# Patient Record
Sex: FEMALE | Race: ASIAN | Employment: UNEMPLOYED | URBAN - METROPOLITAN AREA
[De-identification: names, ages, dates, MRNs, and addresses within clinical notes are randomized per-mention and may not be internally consistent; named-entity substitution may affect disease eponyms.]

---

## 2018-02-17 ENCOUNTER — HOSPITAL ENCOUNTER (EMERGENCY)
Age: 28
Discharge: HOME | End: 2018-02-17
Attending: EMERGENCY MEDICINE
Payer: MEDICAID

## 2018-02-17 VITALS
OXYGEN SATURATION: 99 % | SYSTOLIC BLOOD PRESSURE: 124 MMHG | WEIGHT: 125 LBS | HEIGHT: 63 IN | TEMPERATURE: 98 F | BODY MASS INDEX: 22.15 KG/M2 | RESPIRATION RATE: 16 BRPM | HEART RATE: 69 BPM | DIASTOLIC BLOOD PRESSURE: 78 MMHG

## 2018-02-17 DIAGNOSIS — S60.222A CONTUSION OF LEFT HAND, INITIAL ENCOUNTER: Primary | ICD-10-CM

## 2018-02-17 DIAGNOSIS — S80.11XA CONTUSION OF RIGHT LOWER EXTREMITY, INITIAL ENCOUNTER: ICD-10-CM

## 2018-02-17 PROCEDURE — 99282 EMERGENCY DEPT VISIT SF MDM: CPT
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
## 2022-11-07 ENCOUNTER — APPOINTMENT (OUTPATIENT)
Dept: URBAN - METROPOLITAN AREA CLINIC 242 | Age: 32
Setting detail: DERMATOLOGY
End: 2022-11-07

## 2022-11-07 DIAGNOSIS — L70.0 ACNE VULGARIS: ICD-10-CM

## 2022-11-07 PROCEDURE — OTHER PRESCRIPTION: OTHER

## 2022-11-07 PROCEDURE — 99203 OFFICE O/P NEW LOW 30 MIN: CPT

## 2022-11-07 PROCEDURE — OTHER COUNSELING: OTHER

## 2022-11-07 PROCEDURE — OTHER PRESCRIPTION MEDICATION MANAGEMENT: OTHER

## 2022-11-07 RX ORDER — CLASCOTERONE 1 G/100G
CREAM TOPICAL
Qty: 60 | Refills: 1 | Status: ERX | COMMUNITY
Start: 2022-11-07

## 2022-11-07 RX ORDER — TRETIONIN 0.25 MG/G
CREAM TOPICAL
Qty: 45 | Refills: 1 | Status: ERX | COMMUNITY
Start: 2022-11-07

## 2022-11-07 ASSESSMENT — LOCATION SIMPLE DESCRIPTION DERM: LOCATION SIMPLE: RIGHT CHEEK

## 2022-11-07 ASSESSMENT — LOCATION DETAILED DESCRIPTION DERM: LOCATION DETAILED: RIGHT INFERIOR CENTRAL MALAR CHEEK

## 2022-11-07 ASSESSMENT — LOCATION ZONE DERM: LOCATION ZONE: FACE

## 2022-11-07 NOTE — PROCEDURE: COUNSELING
Minocycline Pregnancy And Lactation Text: This medication is Pregnancy Category D and not consider safe during pregnancy. It is also excreted in breast milk.
Isotretinoin Counseling: Patient should get monthly blood tests, not donate blood, not drive at night if vision affected, not share medication, and not undergo elective surgery for 6 months after tx completed. Side effects reviewed, pt to contact office should one occur.
Include Pregnancy/Lactation Warning?: No
Sarecycline Counseling: Patient advised regarding possible photosensitivity and discoloration of the teeth, skin, lips, tongue and gums.  Patient instructed to avoid sunlight, if possible.  When exposed to sunlight, patients should wear protective clothing, sunglasses, and sunscreen.  The patient was instructed to call the office immediately if the following severe adverse effects occur:  hearing changes, easy bruising/bleeding, severe headache, or vision changes.  The patient verbalized understanding of the proper use and possible adverse effects of sarecycline.  All of the patient's questions and concerns were addressed.
Doxycycline Counseling:  Patient counseled regarding possible photosensitivity and increased risk for sunburn.  Patient instructed to avoid sunlight, if possible.  When exposed to sunlight, patients should wear protective clothing, sunglasses, and sunscreen.  The patient was instructed to call the office immediately if the following severe adverse effects occur:  hearing changes, easy bruising/bleeding, severe headache, or vision changes.  The patient verbalized understanding of the proper use and possible adverse effects of doxycycline.  All of the patient's questions and concerns were addressed.
Doxycycline Pregnancy And Lactation Text: This medication is Pregnancy Category D and not consider safe during pregnancy. It is also excreted in breast milk but is considered safe for shorter treatment courses.
Dapsone Pregnancy And Lactation Text: This medication is Pregnancy Category C and is not considered safe during pregnancy or breast feeding.
Winlevi Pregnancy And Lactation Text: This medication is considered safe during pregnancy and breastfeeding.
Topical Sulfur Applications Counseling: Topical Sulfur Counseling: Patient counseled that this medication may cause skin irritation or allergic reactions.  In the event of skin irritation, the patient was advised to reduce the amount of the drug applied or use it less frequently.   The patient verbalized understanding of the proper use and possible adverse effects of topical sulfur application.  All of the patient's questions and concerns were addressed.
Topical Clindamycin Pregnancy And Lactation Text: This medication is Pregnancy Category B and is considered safe during pregnancy. It is unknown if it is excreted in breast milk.
Azelaic Acid Pregnancy And Lactation Text: This medication is considered safe during pregnancy and breast feeding.
Isotretinoin Pregnancy And Lactation Text: This medication is Pregnancy Category X and is considered extremely dangerous during pregnancy. It is unknown if it is excreted in breast milk.
Azelaic Acid Counseling: Patient counseled that medicine may cause skin irritation and to avoid applying near the eyes.  In the event of skin irritation, the patient was advised to reduce the amount of the drug applied or use it less frequently.   The patient verbalized understanding of the proper use and possible adverse effects of azelaic acid.  All of the patient's questions and concerns were addressed.
Topical Retinoid counseling:  Patient advised to apply a pea-sized amount only at bedtime and wait 30 minutes after washing their face before applying.  If too drying, patient may add a non-comedogenic moisturizer. The patient verbalized understanding of the proper use and possible adverse effects of retinoids.  All of the patient's questions and concerns were addressed.
Erythromycin Counseling:  I discussed with the patient the risks of erythromycin including but not limited to GI upset, allergic reaction, drug rash, diarrhea, increase in liver enzymes, and yeast infections.
Tetracycline Counseling: Patient counseled regarding possible photosensitivity and increased risk for sunburn.  Patient instructed to avoid sunlight, if possible.  When exposed to sunlight, patients should wear protective clothing, sunglasses, and sunscreen.  The patient was instructed to call the office immediately if the following severe adverse effects occur:  hearing changes, easy bruising/bleeding, severe headache, or vision changes.  The patient verbalized understanding of the proper use and possible adverse effects of tetracycline.  All of the patient's questions and concerns were addressed. Patient understands to avoid pregnancy while on therapy due to potential birth defects.
High Dose Vitamin A Pregnancy And Lactation Text: High dose vitamin A therapy is contraindicated during pregnancy and breast feeding.
Dapsone Counseling: I discussed with the patient the risks of dapsone including but not limited to hemolytic anemia, agranulocytosis, rashes, methemoglobinemia, kidney failure, peripheral neuropathy, headaches, GI upset, and liver toxicity.  Patients who start dapsone require monitoring including baseline LFTs and weekly CBCs for the first month, then every month thereafter.  The patient verbalized understanding of the proper use and possible adverse effects of dapsone.  All of the patient's questions and concerns were addressed.
Birth Control Pills Counseling: Birth Control Pill Counseling: I discussed with the patient the potential side effects of OCPs including but not limited to increased risk of stroke, heart attack, thrombophlebitis, deep venous thrombosis, hepatic adenomas, breast changes, GI upset, headaches, and depression.  The patient verbalized understanding of the proper use and possible adverse effects of OCPs. All of the patient's questions and concerns were addressed.
Detail Level: Zone
Birth Control Pills Pregnancy And Lactation Text: This medication should be avoided if pregnant and for the first 30 days post-partum.
Topical Clindamycin Counseling: Patient counseled that this medication may cause skin irritation or allergic reactions.  In the event of skin irritation, the patient was advised to reduce the amount of the drug applied or use it less frequently.   The patient verbalized understanding of the proper use and possible adverse effects of clindamycin.  All of the patient's questions and concerns were addressed.
Benzoyl Peroxide Pregnancy And Lactation Text: This medication is Pregnancy Category C. It is unknown if benzoyl peroxide is excreted in breast milk.
none
Tazorac Pregnancy And Lactation Text: This medication is not safe during pregnancy. It is unknown if this medication is excreted in breast milk.
Bactrim Pregnancy And Lactation Text: This medication is Pregnancy Category D and is known to cause fetal risk.  It is also excreted in breast milk.
High Dose Vitamin A Counseling: Side effects reviewed, pt to contact office should one occur.
Aklief counseling:  Patient advised to apply a pea-sized amount only at bedtime and wait 30 minutes after washing their face before applying.  If too drying, patient may add a non-comedogenic moisturizer.  The most commonly reported side effects including irritation, redness, scaling, dryness, stinging, burning, itching, and increased risk of sunburn.  The patient verbalized understanding of the proper use and possible adverse effects of retinoids.  All of the patient's questions and concerns were addressed.
Spironolactone Pregnancy And Lactation Text: This medication can cause feminization of the male fetus and should be avoided during pregnancy. The active metabolite is also found in breast milk.
Aklief Pregnancy And Lactation Text: It is unknown if this medication is safe to use during pregnancy.  It is unknown if this medication is excreted in breast milk.  Breastfeeding women should use the topical cream on the smallest area of the skin for the shortest time needed while breastfeeding.  Do not apply to nipple and areola.
Minocycline Counseling: Patient advised regarding possible photosensitivity and discoloration of the teeth, skin, lips, tongue and gums.  Patient instructed to avoid sunlight, if possible.  When exposed to sunlight, patients should wear protective clothing, sunglasses, and sunscreen.  The patient was instructed to call the office immediately if the following severe adverse effects occur:  hearing changes, easy bruising/bleeding, severe headache, or vision changes.  The patient verbalized understanding of the proper use and possible adverse effects of minocycline.  All of the patient's questions and concerns were addressed.
Bactrim Counseling:  I discussed with the patient the risks of sulfa antibiotics including but not limited to GI upset, allergic reaction, drug rash, diarrhea, dizziness, photosensitivity, and yeast infections.  Rarely, more serious reactions can occur including but not limited to aplastic anemia, agranulocytosis, methemoglobinemia, blood dyscrasias, liver or kidney failure, lung infiltrates or desquamative/blistering drug rashes.
Winlevi Counseling:  I discussed with the patient the risks of topical clascoterone including but not limited to erythema, scaling, itching, and stinging. Patient voiced their understanding.
Spironolactone Counseling: Patient advised regarding risks of diarrhea, abdominal pain, hyperkalemia, birth defects (for female patients), liver toxicity and renal toxicity. The patient may need blood work to monitor liver and kidney function and potassium levels while on therapy. The patient verbalized understanding of the proper use and possible adverse effects of spironolactone.  All of the patient's questions and concerns were addressed.
Topical Retinoid Pregnancy And Lactation Text: This medication is Pregnancy Category C. It is unknown if this medication is excreted in breast milk.
Azithromycin Pregnancy And Lactation Text: This medication is considered safe during pregnancy and is also secreted in breast milk.
Erythromycin Pregnancy And Lactation Text: This medication is Pregnancy Category B and is considered safe during pregnancy. It is also excreted in breast milk.
Topical Sulfur Applications Pregnancy And Lactation Text: This medication is Pregnancy Category C and has an unknown safety profile during pregnancy. It is unknown if this topical medication is excreted in breast milk.
Azithromycin Counseling:  I discussed with the patient the risks of azithromycin including but not limited to GI upset, allergic reaction, drug rash, diarrhea, and yeast infections.
Benzoyl Peroxide Counseling: Patient counseled that medicine may cause skin irritation and bleach clothing.  In the event of skin irritation, the patient was advised to reduce the amount of the drug applied or use it less frequently.   The patient verbalized understanding of the proper use and possible adverse effects of benzoyl peroxide.  All of the patient's questions and concerns were addressed.
Tazorac Counseling:  Patient advised that medication is irritating and drying.  Patient may need to apply sparingly and wash off after an hour before eventually leaving it on overnight.  The patient verbalized understanding of the proper use and possible adverse effects of tazorac.  All of the patient's questions and concerns were addressed.

## 2023-03-17 ENCOUNTER — OFFICE VISIT (OUTPATIENT)
Dept: FAMILY MEDICINE CLINIC | Facility: CLINIC | Age: 33
End: 2023-03-17
Payer: COMMERCIAL

## 2023-03-17 VITALS
SYSTOLIC BLOOD PRESSURE: 110 MMHG | WEIGHT: 136 LBS | RESPIRATION RATE: 16 BRPM | HEART RATE: 100 BPM | BODY MASS INDEX: 23.22 KG/M2 | DIASTOLIC BLOOD PRESSURE: 60 MMHG | OXYGEN SATURATION: 98 % | HEIGHT: 64 IN | TEMPERATURE: 98 F

## 2023-03-17 DIAGNOSIS — F41.1 GAD (GENERALIZED ANXIETY DISORDER): ICD-10-CM

## 2023-03-17 DIAGNOSIS — Z00.00 WELL WOMAN EXAM WITHOUT GYNECOLOGICAL EXAM: Primary | ICD-10-CM

## 2023-03-17 PROCEDURE — 3078F DIAST BP <80 MM HG: CPT | Performed by: FAMILY MEDICINE

## 2023-03-17 PROCEDURE — 99385 PREV VISIT NEW AGE 18-39: CPT | Performed by: FAMILY MEDICINE

## 2023-03-17 PROCEDURE — 3074F SYST BP LT 130 MM HG: CPT | Performed by: FAMILY MEDICINE

## 2023-03-17 PROCEDURE — 3008F BODY MASS INDEX DOCD: CPT | Performed by: FAMILY MEDICINE

## 2023-03-17 RX ORDER — FLUOXETINE 10 MG/1
10 CAPSULE ORAL DAILY
Qty: 30 CAPSULE | Refills: 1 | Status: SHIPPED | OUTPATIENT
Start: 2023-03-17

## 2023-04-01 ENCOUNTER — PATIENT MESSAGE (OUTPATIENT)
Dept: FAMILY MEDICINE CLINIC | Facility: CLINIC | Age: 33
End: 2023-04-01

## 2023-04-03 ENCOUNTER — TELEPHONE (OUTPATIENT)
Dept: FAMILY MEDICINE CLINIC | Facility: CLINIC | Age: 33
End: 2023-04-03

## 2023-04-03 NOTE — TELEPHONE ENCOUNTER
Patient is calilng she needs a clearance letter  Dr. Karyna Cooper is requesting a letter that she can be on Fluoxetine 10 mg and is it ok for her to take it with Accutane twice daily 30 mg. Is that ok? She has been off the Accutane for a few days and she is worried.  Please call too

## 2023-05-05 ENCOUNTER — TELEMEDICINE (OUTPATIENT)
Dept: FAMILY MEDICINE CLINIC | Facility: CLINIC | Age: 33
End: 2023-05-05
Payer: COMMERCIAL

## 2023-05-05 DIAGNOSIS — F41.1 GAD (GENERALIZED ANXIETY DISORDER): ICD-10-CM

## 2023-05-05 RX ORDER — ISOTRETINOIN 30 MG/1
CAPSULE, LIQUID FILLED ORAL DAILY
COMMUNITY
Start: 2023-04-03

## 2023-05-05 RX ORDER — FLUOXETINE HYDROCHLORIDE 20 MG/1
20 CAPSULE ORAL DAILY
Qty: 90 CAPSULE | Refills: 0 | Status: SHIPPED | OUTPATIENT
Start: 2023-05-05

## 2023-06-09 ENCOUNTER — PATIENT MESSAGE (OUTPATIENT)
Dept: FAMILY MEDICINE CLINIC | Facility: CLINIC | Age: 33
End: 2023-06-09

## 2023-06-09 NOTE — TELEPHONE ENCOUNTER
From: Franklin Nichole  To: Claudean Joiner, DO  Sent: 6/9/2023 1:34 PM CDT  Subject: Fluoxetine 20 MG - Side Effects Inquiry    Hi Dr. Drew Contreras,    [facility] you are doing well :)    I have been taking the increased dose of Fluoxetine 20 MG consistently and have seen positive results. I feel it has decreased my anxious tendencies and I do hope to continue. However, there are 2 side effects I am concerned about. I am developing abnormal amount of hair growth on my face - Hirsutism/beard area and significant hair loss on my head. I am androgen sensitive and wanted to know if this medicine has effect on the adrenal glands - maybe producing androgens? Secondly - I am having extreme drowsiness in the evening. If you can please advise?     best regards,    Bethanie Quiñones

## 2023-07-26 DIAGNOSIS — F41.1 GAD (GENERALIZED ANXIETY DISORDER): ICD-10-CM

## 2023-07-26 NOTE — TELEPHONE ENCOUNTER
Refill request for:    Requested Prescriptions     Pending Prescriptions Disp Refills    FLUOXETINE 20 MG Oral Cap [Pharmacy Med Name: FLUOXETINE HCL 20 MG CAPSULE] 90 capsule 0     Sig: TAKE 1 CAPSULE BY MOUTH EVERY DAY        Last Prescribed Quantity Refills   5/5/23 90 3     LOV 3/17/2023 Tele: 5/5/23    Patient was asked to follow-up in: 3 month    Appointment scheduled: Visit date not found    Medication not on protocol. Pt needs a visit for 3 months medication recheck. Please assist.  Thank you.     Routed to front office

## 2023-07-31 RX ORDER — FLUOXETINE HYDROCHLORIDE 20 MG/1
20 CAPSULE ORAL DAILY
Qty: 90 CAPSULE | Refills: 0 | Status: SHIPPED | OUTPATIENT
Start: 2023-07-31

## 2023-09-01 ENCOUNTER — TELEMEDICINE (OUTPATIENT)
Dept: FAMILY MEDICINE CLINIC | Facility: CLINIC | Age: 33
End: 2023-09-01
Payer: COMMERCIAL

## 2023-09-01 DIAGNOSIS — F41.1 GAD (GENERALIZED ANXIETY DISORDER): ICD-10-CM

## 2023-09-01 PROCEDURE — 99213 OFFICE O/P EST LOW 20 MIN: CPT | Performed by: FAMILY MEDICINE

## 2023-09-01 RX ORDER — FLUOXETINE HYDROCHLORIDE 20 MG/1
20 CAPSULE ORAL DAILY
Qty: 90 CAPSULE | Refills: 1 | Status: SHIPPED | OUTPATIENT
Start: 2023-09-01

## 2023-09-06 NOTE — PROGRESS NOTES
VIDEO VISIT  This visit is conducted using Telemedicine with live, interactive video and audio. Patient has been referred to the [facility] website at [email or web address] to review the yearly Consent to Treat document. Patient understands and accepts financial responsibility for any deductible, co-insurance and/or co-pays associated with this service. Chief Complaint:  Patient presents with:  Medication Follow-Up: Med refill      HPI:  Ladi Adams is a 29year old female here today for a telemedicine/video visit. MARINA: Last visit patient noted some improvement with fluoxetine but still significant symptoms. Increased fluoxetine to 20 mg. This was 3 months ago. Symptoms have significantly improved and she is feeling well. Denies depression. Denies SI/HI. Health Maintenance:  DTaP,Tdap,and Td Vaccines(1 - Tdap) Never done  COVID-19 Vaccine(4 - Moderna series) due on 05/10/2022  Influenza Vaccine(1) due on 10/01/2023  Annual Physical due on 03/17/2024  Pap Smear due on 03/10/2026  Annual Depression Screening Completed  Pneumococcal Vaccine: Birth to 64yrs Aged Out    ROS: Review of systems performed and negative unless stated in HPI. Past medical, family and social history reviewed and listed as below.     HISTORY:  Past Medical History:   Diagnosis Date    ACNE       Past Surgical History:   Procedure Laterality Date    NASAL SURG PROC UNLISTED  09/2022    septoplasty      Family History   Problem Relation Age of Onset    Thyroid disease Mother     High Blood Pressure Mother     High Blood Pressure Father     Stroke Father     Diabetes Maternal Grandmother       Social History     Socioeconomic History    Marital status: Single   Occupational History    Occupation:    Tobacco Use    Smoking status: Never    Smokeless tobacco: Never   Vaping Use    Vaping Use: Never used   Substance and Sexual Activity    Alcohol use: Never    Drug use: Never   Other Topics Concern    Caffeine Concern Yes     Comment: 1 daily    Exercise Yes     Comment: 1 a week    Seat Belt Yes    Self-Exams No        Current Outpatient Medications   Medication Sig Dispense Refill    FLUoxetine 20 MG Oral Cap Take 1 capsule (20 mg total) by mouth daily. 90 capsule 1    CLARAVIS 30 MG Oral Cap Take by mouth daily. Allergies:    Sulfa Antibiotics       SWELLING  Lactose                 OTHER (SEE COMMENTS)    EXAM:  There were no vitals filed for this visit. alert, appears stated age, and cooperative, Normocephalic, without obvious abnormality, atraumatic, lips, mucosa, and tongue normal; teeth and gums normal, Speaking in full sentences comfortably, and Normal work of breathing    ASSESSMENT AND PLAN:  Discussed the following assessment   Problem List Items Addressed This Visit    None  Visit Diagnoses       MARINA (generalized anxiety disorder)        Relevant Medications    FLUoxetine 20 MG Oral Cap            Advised the following:  Dona Alonso was seen today for medication follow-up. Diagnoses and all orders for this visit:    MARINA (generalized anxiety disorder)  -    Improved. Continue FLUoxetine 20 MG Oral Cap; Take 1 capsule (20 mg total) by mouth daily. Return to clinic in 6 months. Ian Humphrey DO  9/1/2023 7:41 PM  Family Medicine    Note to Patient  The [facility] makes medical notes like these available to patients in the interest of transparency. However, be advised this is a medical document and is intended as [id number] communication; it is written in medical language and may appear blunt, direct, or contain abbreviations or verbiage that are unfamiliar. Medical documents are intended to carry relevant information, facts as evident, and the clinical opinion of the practitioner.      This report has been produced using speech recognition software, and may contain errors related to grammar, punctuation, spelling, words or phrases unrecognized or not translated appropriately to text; these errors may be referred to the dictating provider for further clarification and/or addendum as needed.

## 2023-11-14 ENCOUNTER — PATIENT MESSAGE (OUTPATIENT)
Dept: FAMILY MEDICINE CLINIC | Facility: CLINIC | Age: 33
End: 2023-11-14

## 2023-11-14 NOTE — TELEPHONE ENCOUNTER
From: Nader Mcrae  To: Doritakorytory Kaba  Sent: 11/14/2023 12:21 PM CST  Subject: Question - Pregnancy     Hi Dr. Nir Díaz,    I hope you are doing well - I have a medical concern that I wanted to share with you. I have been on accutane for about 8 months/along with fluoxetine. I have been practicing abstinence - however this past month my long distance boyfriend visited and I found out I had an unplanned pregnancy yesterday - about 5 weeks along. I have stopped both Accutane (Birth Defects)/fluoxetine Sunday evening. I am concerned on how to proceed forward - if you can please advise.     best regards,    Paige Nagel

## 2023-11-15 ENCOUNTER — TELEPHONE (OUTPATIENT)
Dept: FAMILY MEDICINE CLINIC | Facility: CLINIC | Age: 33
End: 2023-11-15

## 2023-11-15 PROBLEM — O35.9XX0 TERATOGEN EXPOSURE IN CURRENT PREGNANCY: Status: ACTIVE | Noted: 2023-11-15

## 2023-11-15 NOTE — TELEPHONE ENCOUNTER
Dr Francisca Henriquez called to speak to dr Chandler Germain for patient condition Dr Chandler Germain is no in today.

## 2023-11-15 NOTE — TELEPHONE ENCOUNTER
Dermatologist Dr. Rachelle Marshall wanted Dr. Hema Szymanski to know that Pt was on Accutane and is now 4 weeks pregnant. Pt has stopped med. Chart reviewed and looks like Dr. Hema Szymanski is aware. Does Pt need to see GYNE ASAP? Does it need to be high risk?

## 2023-11-16 NOTE — TELEPHONE ENCOUNTER
Yes- see atif with patient. I reached out to gynecology for curbside. I recommend she get in to see gyne asap for visit. Likely will need to MFM around 12 weeks. If she does not have gyne, then I would refer to Dr. Carrillo Lob office.    Let me know if she is not able to get in to see gyne asap     Eugenia Nvaao, DO

## 2023-11-17 ENCOUNTER — TELEPHONE (OUTPATIENT)
Dept: OBGYN CLINIC | Facility: CLINIC | Age: 33
End: 2023-11-17

## 2023-11-17 NOTE — TELEPHONE ENCOUNTER
Nurse Elisha Borjas from Dr. Kacey uW office called she wants to speak to the nurse regarding one of there pt. Pt is about 5 wks pregnant and Dr. Aileen Gallardo wants her to be seen asap. We don't have opening asap, not sure how urgent. Please call Elisha Borjas back.  Office # [phone]

## 2023-11-17 NOTE — TELEPHONE ENCOUNTER
Information given to PT. Pt states has appt with a Gyne out of [address] [place] [address] on 11/27/23- advised to update them on issue and see if can be seen sooner. Pt called Dr. Renita Medina office and was not able to be seen till 1/2024.   I called Dr. Radha Garcia office to try and get in sooner-

## 2023-11-17 NOTE — TELEPHONE ENCOUNTER
Spoke with OB , Pascual Sanchez, who spoke with Dr Tesha Arias. Plan for pt to see genetic counselor as scheduled on 11/22 (see appt info below). OB office has scheduled pt for 12/12 at 2:45 pm for a new OB appt. Pt to have US at this appt as well as exam with Dr Tesha Arias. OB requests pt give [employer] authorization to recent visit/US with Adriano. Will plan for MFM appt around 12 wks. Please ask pt date of LMP and update chart per OB office request.    Please discuss all of above with pt upon call back. A message was left for her to call the office. If she decides to move forward with termination she should cancel above appointments.

## 2023-11-17 NOTE — TELEPHONE ENCOUNTER
Left message on home number for patient to call back. Please notify patient that we have scheduled her an appointment with a genetic counselor Loraine Chapin) on 11/22/2023 at 9:30 am.     Location Instruction: Your appointment is on the [address] [place] [address] in the [facility]. The address is [address] [place] [address]. Please register at the [address] [place] [address] on the first floor.

## 2023-11-17 NOTE — TELEPHONE ENCOUNTER
New Patient    Per patient's MyChart message she just found out she is about 5 weeks pregnant. No LMP. Was taking Accutane and Fluoxetine. Patient aware of high risk of birth defects and high rate of miscarriage. Per patient's last MyChart message she is thinking about not continuing prenatal care. PCP's office calling to have patient be seen asap. Message routed to MD on call for further review.

## 2023-11-17 NOTE — TELEPHONE ENCOUNTER
Spoke with pt. Reviewed scheduled appts. Original US done at [facility] in [place]. Pt was given copy of US and a \"printout. \" She was asked to upload pictures of these via [address] No heartbeat seen on US, but was too early to tell. Gestational sac was measuring 4 weeks 6 days on 11/13/2023. LMP 10/8/2023. CRYSTAL per LMP is 7/14/2024, with current GA of 5w5d - a 2 day advance from [address] dating. Pt aware to cancel either appt with genetic counselor or OB if she decides to terminate prior to appt. Patient has no further questions or concerns at this time. Instructed to call back if she has any questions. She verbalized understanding and agrees with plan.

## 2023-11-22 ENCOUNTER — GENETICS ENCOUNTER (OUTPATIENT)
Dept: GENETICS | Facility: HOSPITAL | Age: 33
End: 2023-11-22
Attending: GENETIC COUNSELOR, MS
Payer: COMMERCIAL

## 2023-11-22 ENCOUNTER — APPOINTMENT (OUTPATIENT)
Dept: HEMATOLOGY/ONCOLOGY | Facility: HOSPITAL | Age: 33
End: 2023-11-22
Attending: GENETIC COUNSELOR, MS
Payer: COMMERCIAL

## 2023-11-22 DIAGNOSIS — O35.9XX0 TERATOGEN EXPOSURE IN CURRENT PREGNANCY: Primary | ICD-10-CM

## 2023-11-22 NOTE — PROGRESS NOTES
Patient Name: Laura Hernandez  YOB: 1990  Date of Visit: 11/22/2023    Reason for visit: Ms. Tasia Aviles was seen for the purposes of genetic counseling to discuss prenatal exposure to accutane/isotretinoin. She was accompanied to the visit by her mother. Referring Provider: Marino Zarate DO    Medical History: Ms. Tasia Aviles is a pleasant 29year old female who has been regularly taking accutane/isotretinoin as prescribed by her dermatologist. She had been abstinent as part of the pregnancy prevention goals outlined in the [facility] program for isotretinoin prescription. She recently discovered she had an unplanned pregnancy and immediately discontinued use of isotretinoin and as well as fluoxetine she was taking on 11/12/2023. Her LMP was 10/8/2023. Per LMP the CRYSTAL would be 7/14/2024 with a GA of 6 weeks 3 days currently. She had an ultrasound at [facility] in [place] [employer] on 11/13/23 which visualized the gestational sac, no FHTs were seen on US, but it may have been too early to detect them. Summary:   Ms. Tasia Aviles shared that she is well aware of the risks of major physical and developmental/intellectual defects resulting from any amount of isotretinoin exposure in the early stages of pregnancy. We reviewed the risk of physical birth defects in infants exposed to isotretinoin in utero is estimated to be at least 20-35%. The numerous possible congenital defects including craniofacial defects, cardiovascular and neurological malformations, or thymic disorders which may be visible on ultrasound should the pregnancy progress and not miscarry. Additionally, neurocognitive impairments, such as moderate to severe behavioral problems and/or intellectual disability, even in the absence of any physical defects have been seen in 30-60% of children exposed to isotretinoin during the prenatal period.  It is not possible to detected these neurocognitive impairments on ultrasound and they would likely not become apparent until early childhood. Ms. Jacki Pina shared that she had done a lot of research on possible outcomes and was aware of the risks due to isotretinoin prescribing requirements and has decided to electively discontinue the pregnancy as soon as possible as she does not want to have a fetus/child possibly have to face severe physical or neurocognitive impairments. She expressed feeling comfortable and confident in her decision. She will be reaching out to her PCP to discuss next steps for discontinuing the pregnancy as soon as possible. All of Ms. Leon Armendariz questions were answered and I shared my contact information with her. Thank you for allowing me to participate in the care of your patient; please do not hesitate to contact my office if you have any questions or concerns, [phone].       Send to: Sal Stovall  Time spent with patient: 20 minutes

## 2024-01-23 ENCOUNTER — PATIENT MESSAGE (OUTPATIENT)
Dept: FAMILY MEDICINE CLINIC | Facility: CLINIC | Age: 34
End: 2024-01-23

## 2024-01-23 DIAGNOSIS — J45.20 MILD INTERMITTENT ASTHMA WITHOUT COMPLICATION: Primary | ICD-10-CM

## 2024-01-24 NOTE — TELEPHONE ENCOUNTER
From: Kavitha Mendiola  To: Sharda Waldrop  Sent: 1/23/2024 5:21 PM CST  Subject: Inhaler Request     Hi Dr. Waldrop,    Hope you are doing well - I am reaching out regarding my asthma. It has been acting up because of my parent's cat however I ran out of my inhaler.    Would i have to schedule an appointment or am I able to get an inhaler prescribed?    best regards,    Carmita Mendiola

## 2024-01-25 RX ORDER — ALBUTEROL SULFATE 90 UG/1
1 AEROSOL, METERED RESPIRATORY (INHALATION) EVERY 4 HOURS PRN
Qty: 1 EACH | Refills: 2 | Status: SHIPPED | OUTPATIENT
Start: 2024-01-25

## 2024-02-15 DIAGNOSIS — F41.1 GAD (GENERALIZED ANXIETY DISORDER): ICD-10-CM

## 2024-02-15 NOTE — TELEPHONE ENCOUNTER
Refill request for:    Requested Prescriptions     Pending Prescriptions Disp Refills    FLUoxetine 20 MG Oral Cap 90 capsule 1     Sig: Take 1 capsule (20 mg total) by mouth daily.        Last Prescribed Quantity Refills   9/01/23 90 1     LOV 3/17/2023  Tele: 9/01/23    Patient was asked to follow-up in: 6 months    Appointment scheduled: Visit date not found    Medication not on protocol.     Patient comment: I have been feeling very anxious and have been experiencing OCD symptoms again. I am hoping to increase my strength         # 90 with 1 refills routed to Sharda Waldrop DO for review

## 2024-02-16 RX ORDER — FLUOXETINE HYDROCHLORIDE 20 MG/1
20 CAPSULE ORAL 2 TIMES DAILY
Qty: 180 CAPSULE | Refills: 1 | Status: SHIPPED | OUTPATIENT
Start: 2024-02-16

## 2024-12-11 ENCOUNTER — APPOINTMENT (OUTPATIENT)
Dept: DERMATOLOGY | Age: 34
End: 2024-12-11

## 2025-06-26 ENCOUNTER — APPOINTMENT (OUTPATIENT)
Dept: URBAN - METROPOLITAN AREA CLINIC 242 | Age: 35
Setting detail: DERMATOLOGY
End: 2025-06-26

## 2025-06-26 DIAGNOSIS — L70.0 ACNE VULGARIS: ICD-10-CM

## 2025-06-26 DIAGNOSIS — L21.8 OTHER SEBORRHEIC DERMATITIS: ICD-10-CM

## 2025-06-26 PROCEDURE — OTHER PRESCRIPTION: OTHER

## 2025-06-26 PROCEDURE — OTHER COUNSELING: OTHER

## 2025-06-26 PROCEDURE — 99204 OFFICE O/P NEW MOD 45 MIN: CPT

## 2025-06-26 RX ORDER — TRETIONIN 0.25 MG/G
CREAM TOPICAL QHS
Qty: 135 | Refills: 1 | Status: ERX | COMMUNITY
Start: 2025-06-26

## 2025-06-26 ASSESSMENT — LOCATION DETAILED DESCRIPTION DERM
LOCATION DETAILED: RIGHT CENTRAL MALAR CHEEK
LOCATION DETAILED: RIGHT SUPERIOR PARIETAL SCALP
LOCATION DETAILED: RIGHT MEDIAL FRONTAL SCALP
LOCATION DETAILED: RIGHT SUPERIOR OCCIPITAL SCALP
LOCATION DETAILED: LEFT INFERIOR MEDIAL FOREHEAD
LOCATION DETAILED: LEFT CENTRAL MALAR CHEEK

## 2025-06-26 ASSESSMENT — LOCATION SIMPLE DESCRIPTION DERM
LOCATION SIMPLE: RIGHT CHEEK
LOCATION SIMPLE: RIGHT SCALP
LOCATION SIMPLE: LEFT FOREHEAD
LOCATION SIMPLE: RIGHT OCCIPITAL SCALP
LOCATION SIMPLE: LEFT CHEEK
LOCATION SIMPLE: SCALP

## 2025-06-26 ASSESSMENT — LOCATION ZONE DERM
LOCATION ZONE: FACE
LOCATION ZONE: SCALP

## 2025-07-22 ENCOUNTER — RX ONLY (RX ONLY)
Age: 35
End: 2025-07-22

## 2025-07-22 RX ORDER — TRETIONIN 0.25 MG/G
CREAM TOPICAL
Qty: 20 | Refills: 2 | Status: ERX

## (undated) NOTE — LETTER
Date: 4/3/2023    Patient Name: Solange Lopez          To Whom it may concern:    Ms. Pebbles Mcneil is under my care. We recently started fluoxetine for anxiety. We will closely monitor mental health in the setting of accutane given the potential for worsening or new symptoms. She is ok to receive accutane from a mental health standpoint.         Sincerely,        Bryson Jeans, DO

## (undated) NOTE — ED AVS SNAPSHOT
Cassia Ramsey   MRN: [id number]    Department:  [facility] Department in [place]   Date of Visit:  2/17/2018           Disclosure     Insurance plans vary and the physician(s) referred by the ER may not be covered by your plan.  Please contact yo tell this physician (or your personal doctor if your instructions are to return to your personal doctor) about any new or lasting problems. The primary care or specialist physician will see patients referred from the [facility] Emergency Department.  [place]